# Patient Record
Sex: FEMALE | URBAN - NONMETROPOLITAN AREA
[De-identification: names, ages, dates, MRNs, and addresses within clinical notes are randomized per-mention and may not be internally consistent; named-entity substitution may affect disease eponyms.]

---

## 2020-02-04 ENCOUNTER — APPOINTMENT (RX ONLY)
Dept: URBAN - NONMETROPOLITAN AREA CLINIC 29 | Facility: CLINIC | Age: 45
Setting detail: DERMATOLOGY
End: 2020-02-04

## 2020-02-04 VITALS — SYSTOLIC BLOOD PRESSURE: 120 MMHG | DIASTOLIC BLOOD PRESSURE: 72 MMHG

## 2020-02-04 DIAGNOSIS — L72.8 OTHER FOLLICULAR CYSTS OF THE SKIN AND SUBCUTANEOUS TISSUE: ICD-10-CM

## 2020-02-04 DIAGNOSIS — Z71.89 OTHER SPECIFIED COUNSELING: ICD-10-CM

## 2020-02-04 DIAGNOSIS — D49.2 NEOPLASM OF UNSPECIFIED BEHAVIOR OF BONE, SOFT TISSUE, AND SKIN: ICD-10-CM

## 2020-02-04 PROCEDURE — 99201: CPT | Mod: 25

## 2020-02-04 PROCEDURE — ? COUNSELING

## 2020-02-04 PROCEDURE — ? PATIENT SPECIFIC COUNSELING

## 2020-02-04 PROCEDURE — ? SUNSCREEN RECOMMENDATIONS

## 2020-02-04 PROCEDURE — ? SHAVE REMOVAL

## 2020-02-04 PROCEDURE — 11301 SHAVE SKIN LESION 0.6-1.0 CM: CPT

## 2020-02-04 ASSESSMENT — LOCATION SIMPLE DESCRIPTION DERM
LOCATION SIMPLE: LEFT CLAVICULAR SKIN
LOCATION SIMPLE: CHEST

## 2020-02-04 ASSESSMENT — LOCATION DETAILED DESCRIPTION DERM
LOCATION DETAILED: LEFT MEDIAL SUPERIOR CHEST
LOCATION DETAILED: LEFT CLAVICULAR SKIN

## 2020-02-04 ASSESSMENT — LOCATION ZONE DERM: LOCATION ZONE: TRUNK

## 2020-02-04 NOTE — PROCEDURE: SHAVE REMOVAL
Notification Instructions: Patient will be notified of biopsy results. However, patient instructed to call the office if not contacted within 2 weeks.
Medical Necessity Information: It is in your best interest to select a reason for this procedure from the list below. All of these items fulfill various CMS LCD requirements except the new and changing color options.
Consent was obtained from the patient. The risks and benefits to therapy were discussed in detail. Specifically, the risks of infection, scarring, bleeding, prolonged wound healing, incomplete removal, allergy to anesthesia, nerve injury and recurrence were addressed. Prior to the procedure, the treatment site was clearly identified and confirmed by the patient. All components of Universal Protocol/PAUSE Rule completed.
Hemostasis: Electrocautery
Anesthesia Type: 1% lidocaine with epinephrine
Medical Necessity Clause: This procedure was medically necessary because the lesion that was treated was:  suspicious for malignancy
Bill For Surgical Tray: no
X Size Of Lesion In Cm (Optional): 0.6
Was A Bandage Applied: Yes
Post-Care Instructions: I reviewed with the patient in detail post-care instructions. Patient is to keep the biopsy site dry overnight, and then apply bacitracin twice daily until healed. Patient may apply hydrogen peroxide soaks to remove any crusting.
Billing Type: Third-Party Bill
Anesthesia Volume In Cc: 0.4
Detail Level: Detailed
Wound Care: Aquaphor
Biopsy Method: Personna blade

## 2020-02-04 NOTE — PROCEDURE: MIPS QUALITY
Quality 265: Biopsy Follow-Up: Biopsy results reviewed, communicated, tracked, and documented
Detail Level: Generalized
Quality 226: Preventive Care And Screening: Tobacco Use: Screening And Cessation Intervention: Tobacco Screening not Performed for Medical Reasons
Quality 130: Documentation Of Current Medications In The Medical Record: Current Medications Documented

## 2021-06-03 ENCOUNTER — APPOINTMENT (RX ONLY)
Dept: URBAN - NONMETROPOLITAN AREA CLINIC 29 | Facility: CLINIC | Age: 46
Setting detail: DERMATOLOGY
End: 2021-06-03

## 2021-06-03 DIAGNOSIS — D49.2 NEOPLASM OF UNSPECIFIED BEHAVIOR OF BONE, SOFT TISSUE, AND SKIN: ICD-10-CM

## 2021-06-03 DIAGNOSIS — L81.1 CHLOASMA: ICD-10-CM

## 2021-06-03 DIAGNOSIS — Z71.89 OTHER SPECIFIED COUNSELING: ICD-10-CM

## 2021-06-03 DIAGNOSIS — L81.4 OTHER MELANIN HYPERPIGMENTATION: ICD-10-CM

## 2021-06-03 PROCEDURE — ? IN-HOUSE DISPENSING PHARMACY

## 2021-06-03 PROCEDURE — 99213 OFFICE O/P EST LOW 20 MIN: CPT | Mod: 25

## 2021-06-03 PROCEDURE — ? SHAVE REMOVAL

## 2021-06-03 PROCEDURE — ? COUNSELING

## 2021-06-03 PROCEDURE — ? SUNSCREEN RECOMMENDATIONS

## 2021-06-03 PROCEDURE — 11300 SHAVE SKIN LESION 0.5 CM/<: CPT

## 2021-06-03 ASSESSMENT — LOCATION DETAILED DESCRIPTION DERM
LOCATION DETAILED: RIGHT INFERIOR MEDIAL FOREHEAD
LOCATION DETAILED: SUPERIOR MID FOREHEAD
LOCATION DETAILED: RIGHT ANTERIOR PROXIMAL THIGH

## 2021-06-03 ASSESSMENT — LOCATION ZONE DERM
LOCATION ZONE: LEG
LOCATION ZONE: FACE

## 2021-06-03 ASSESSMENT — LOCATION SIMPLE DESCRIPTION DERM
LOCATION SIMPLE: RIGHT THIGH
LOCATION SIMPLE: RIGHT FOREHEAD
LOCATION SIMPLE: SUPERIOR FOREHEAD

## 2021-06-03 NOTE — PROCEDURE: IN-HOUSE DISPENSING PHARMACY
Pt notified.   
Product 50 Amount/Unit (Numbers Only): 0
Product 4 Amount/Unit (Numbers Only): 30
Product 13 Price/Unit (In Dollars): 45.00
Product 80 Unit Type: mg
Product 8 Unit Type: grams
Product 29 Price/Unit (In Dollars): 65.00
Detail Level: Simple
Product 42 Application Directions: Apply to dark spots BID daily.
Product 44 Price/Unit (In Dollars): 90.00
Product 5 Unit Type: ml
Product 9 Application Directions: Apply to chest, shoulder and back once before bed time
Name Of Product 8: Hydrating 0.05% Tretinoin Cream
Product 10 Refills: 4
Product 22 Amount/Unit (Numbers Only): 60
Name Of Product 43: Alopecia Topical Solution for Men
Product 23 Price/Unit (In Dollars): 50.00
Name Of Product 36: Clobetasol solution
Name Of Product 26: Ketoconazole/ hydrocortisone cream
Name Of Product 11: Metronidazole Gel
Name Of Product 31: Urea 40% cream
Product 31 Price/Unit (In Dollars): 40.00
Name Of Product 6: Clindamycin Gel
Name Of Product 1: Sodium sulfacetamide 8%
Name Of Product 13: Rosacea Cream
Product 29 Amount/Unit (Numbers Only): 120
Product 1 Refills: 3
Name Of Product 24: Fluocinolone Scalp and Body Oil
Name Of Product 22: Clobetasol Cream
Name Of Product 10: Female Hormonal Acne Gel
Name Of Product 4: BP 2.5% / Clindamycin Combination Gel
Product 41 Refills: 1
Name Of Product 41: AK Imiquimod Gel
Product 28 Price/Unit (In Dollars): 30.00
Product 8 Application Directions: Apply a pea size amount once nightly every other night for 3 weeks, then working up to [facility]
Product 41 Application Directions: Apply to spot on nose once daily for 2 weeks.
Name Of Product 23: Clobetasol Ointment
Name Of Product 21: Anti-Fungal Shampoo
Name Of Product 42: Skin Brightening Hydroquinone 8% Combination
Product 26 Application Directions: Apply to rash BID for 4 weeks.
Product 1 Application Directions: Use once daily and rinse off.
Name Of Product 2: Acne Triple Gel Combination
Product 23 Application Directions: Apply BID to rash on body for two weeks
Render Product Pricing In Note: Yes
Product 12 Application Directions: Apply to face once daily.
Name Of Product 3: Adapalene 0.3% Gel
Name Of Product 44: Anti-aging Body Tretinoin 0.05% Cream
Product 10 Application Directions: Apply topically to entire face once a day to start then gradually increase to twice a day
Product 7 Application Directions: Apply every morning.
Name Of Product 33: Seborrheic Dermatitis Shampoo   120ml
Product 1 Price/Unit (In Dollars): 35.00
Product 44 Amount/Unit (Numbers Only): 240
Name Of Product 27: Hydrocortisone 2.5% / Tranilast 0.5% / Levo 2%
Product 22 Application Directions: Apply BID to affected areas
Name Of Product 32: Tacrolimus 0.1% Ointment
Name Of Product 12: Rosacea Triple Combination Gel
Product 4 Application Directions: Apply to affected area twice a day
Name Of Product 28: Triamcinolone Cream
Product 32 Application Directions: Apply twice daily to affected areas.
Name Of Product 5: BP 8% Acne Wash
Name Of Product 25: Fluocinonide Cream
Name Of Product 9: Tretinoin 0.025% / Clindamycin Combination Cream
Name Of Product 7: Dapsone 6% Gel
Name Of Product 45: Skin brightening hydroquinone 8% combination sensitive skin

## 2021-06-03 NOTE — PROCEDURE: SHAVE REMOVAL
Add Variable For Additional Medical Justification: No
Medical Necessity Information: It is in your best interest to select a reason for this procedure from the list below. All of these items fulfill various CMS LCD requirements except the new and changing color options.
Size Of Lesion In Cm (Required): 0.5
Notification Instructions: Patient will be notified of biopsy results. However, patient instructed to call the office if not contacted within 2 weeks.
Biopsy Method: Dermablade
Consent was obtained from the patient. The risks and benefits to therapy were discussed in detail. Specifically, the risks of infection, scarring, bleeding, prolonged wound healing, incomplete removal, allergy to anesthesia, nerve injury and recurrence were addressed. Prior to the procedure, the treatment site was clearly identified and confirmed by the patient. All components of Universal Protocol/PAUSE Rule completed.
Hemostasis: Electrocautery
Medical Necessity Clause: This procedure was medically necessary because the lesion that was treated was:
Anesthesia Type: 1% lidocaine without epinephrine
Post-Care Instructions: I reviewed with the patient in detail post-care instructions. Patient is to keep the biopsy site dry overnight, and then apply bacitracin twice daily until healed. Patient may apply hydrogen peroxide soaks to remove any crusting.
Billing Type: Third-Party Bill
X Size Of Lesion In Cm (Optional): 0
Was A Bandage Applied: Yes
Wound Care: Aquaphor
Anesthesia Volume In Cc: 0.6
Detail Level: Detailed